# Patient Record
(demographics unavailable — no encounter records)

---

## 2024-11-13 NOTE — HISTORY OF PRESENT ILLNESS
[FreeTextEntry1] : Patient is a 58 y/o female with a PMHx of Migraines, uterine leiomyoma, GERD, Gastritis, esophageal hiatal hernia, Duodenal Polyp, Chronic Constipation, Colon Polyps, and Grade I Internal Hemorrhoids who presents c/o chronic constipation and abdominal bloating x several months. Last EGD was 12/5/2022, which showed gastritis, 2 cm esophageal hiatal hernia, 1 cm duodenal bulb polyp, pathology came back as gastric metaplasia, and was negative for H. Pylori. Last colonoscopy was 8/24/2021, which showed a 5 mm ascending polyp, pathology came back as a tubular adenoma. Pt was instructed to f/u in 3 years for next colonoscopy, but did not f/u until today.

## 2024-11-13 NOTE — ASSESSMENT
[FreeTextEntry1] : Recommended OTC Miralax once daily for constipation. Recommended OTC Atrantil for bloating. Referred pt for diagnostic colonoscopy. Pt expressed understanding and agrees with the plan.  Pt expressed understanding and agrees with the plan.  The causes of constipation were discussed at length. We discussed: Eat three meals each day. Do not skip meals. Gradually increase the amount of FIBER in your diet. Choose more whole grain breads, cereals, and rice. Select more raw fruits and vegetables and eat the peel, if appropriate. Read food labels and look for the "dietary fiber" content of foods. Good sources have 2 grams of fiber or more. Drink six to eight glasses of water each day. Limit highly refined and processed foods.  A low acid/reflux diet was discussed in great detail including not smoking, not drinking alcohol, and not consuming foods that irritate the esophagus. It is helpful to eat small meals throughout the day instead of large meals. You should avoid eating before bedtime or lying down after you eat. It can be helpful to raise the head of your bed six inches. Additionally, you should maintain a healthy weight and good posture. The patient was given written material to take home and review.  1. GI HEALTH  FODMAP stands for fermentable oligosaccharides, disaccharides, monosaccharides, and polyols, which are short-chain carbohydrates (sugars) that the small intestine absorbs poorly. Some people experience digestive distress after eating them. Symptoms include: Cramping Diarrhea Constipation Abdominal Bloating Gas and Flatulence   2. EXERCISE  Physical activity increases blood flow to the muscles in the digestive system, which massage our food along the digestive tract, a process known as peristalsis, causing them to work more quickly and effectively. Research also suggests that exercise affects the balance of bacteria in the gut.   3. BLOATING  Abdominal cramping, excessive gas, and sharp pains in the abdomen can all be symptoms a patient endures when suffering from stomach bloating. Stomach bloating occurs when gas fails to leave the body either through flatulence or belching, but instead builds up inside the intestines or stomach and results in mild to severe bloating. Bloating is related to stress levels, gastrointestinal issues, smoking, and processed foods. Fatty foods, such as hamburgers, fried chicken, and desserts, often contain saturated and trans fats, which may increase the full feeling a patient may experience.  I spent 35 minutes reviewing the patient's records prior to arrival, with the patient, and reviewing records after the visit. All prior testing reviewed at length. Patient verbalized understanding of all information provided. All questions answered and reviewed.  Robert Brunner, MD

## 2024-11-13 NOTE — REVIEW OF SYSTEMS
[As Noted in HPI] : as noted in HPI [Bloating (gassiness)] : bloating [Negative] : Heme/Lymph [Constipation] : constipation

## 2024-11-13 NOTE — REASON FOR VISIT
[Follow-up] : a follow-up of an existing diagnosis [FreeTextEntry1] : Chronic Constipation, Abdominal Bloating